# Patient Record
Sex: FEMALE | Race: WHITE | NOT HISPANIC OR LATINO | URBAN - METROPOLITAN AREA
[De-identification: names, ages, dates, MRNs, and addresses within clinical notes are randomized per-mention and may not be internally consistent; named-entity substitution may affect disease eponyms.]

---

## 2021-02-25 ENCOUNTER — HOSPITAL ENCOUNTER (OUTPATIENT)
Dept: CARDIOLOGY | Facility: HOSPITAL | Age: 71
Discharge: HOME | End: 2021-02-25
Attending: SURGERY

## 2022-08-25 ENCOUNTER — HOSPITAL ENCOUNTER (INPATIENT)
Dept: HOSPITAL 49 - FER | Age: 72
LOS: 6 days | Discharge: NURSING, REHAB OR LONG-TERM CARE | DRG: 871 | End: 2022-08-31
Attending: INTERNAL MEDICINE | Admitting: FAMILY MEDICINE
Payer: COMMERCIAL

## 2022-08-25 VITALS — HEIGHT: 62 IN | BODY MASS INDEX: 17.28 KG/M2 | WEIGHT: 93.92 LBS

## 2022-08-25 DIAGNOSIS — E87.0: ICD-10-CM

## 2022-08-25 DIAGNOSIS — Z20.822: ICD-10-CM

## 2022-08-25 DIAGNOSIS — K56.41: ICD-10-CM

## 2022-08-25 DIAGNOSIS — E44.0: ICD-10-CM

## 2022-08-25 DIAGNOSIS — J96.21: ICD-10-CM

## 2022-08-25 DIAGNOSIS — G93.41: ICD-10-CM

## 2022-08-25 DIAGNOSIS — Z79.02: ICD-10-CM
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
## 2022-08-25 DIAGNOSIS — Z66: ICD-10-CM

## 2022-08-25 DIAGNOSIS — E86.0: ICD-10-CM

## 2022-08-25 DIAGNOSIS — D64.9: ICD-10-CM

## 2022-08-25 DIAGNOSIS — I69.954: ICD-10-CM

## 2022-08-25 DIAGNOSIS — E78.5: ICD-10-CM

## 2022-08-25 DIAGNOSIS — A41.9: Primary | ICD-10-CM

## 2022-08-25 DIAGNOSIS — Z79.899: ICD-10-CM

## 2022-08-25 DIAGNOSIS — J98.11: ICD-10-CM

## 2022-08-25 DIAGNOSIS — J69.0: ICD-10-CM

## 2022-08-25 DIAGNOSIS — K94.21: ICD-10-CM

## 2022-08-25 DIAGNOSIS — E83.52: ICD-10-CM

## 2022-08-25 DIAGNOSIS — Z79.82: ICD-10-CM

## 2022-08-25 DIAGNOSIS — L89.313: ICD-10-CM

## 2022-08-25 DIAGNOSIS — E05.90: ICD-10-CM

## 2022-08-25 DIAGNOSIS — E11.9: ICD-10-CM

## 2022-08-25 DIAGNOSIS — J18.9: ICD-10-CM

## 2022-08-25 DIAGNOSIS — N17.9: ICD-10-CM

## 2022-08-25 LAB
ALBUMIN SERPL-MCNC: 2.5 G/DL (ref 3.4–5)
ALKALINE PHOSHATASE: 134 U/L (ref 46–116)
ALT SERPL-CCNC: 27 U/L (ref 14–59)
AST: 22 U/L (ref 15–37)
BASOPHIL: 0.3 % (ref 0–2)
BILIRUBIN - TOTAL: 0.3 MG/DL (ref 0.2–1)
BILIRUBIN: NEGATIVE MG/DL
BLOOD: NEGATIVE ERY/UL
BUN SERPL-MCNC: 36 MG/DL (ref 7–18)
BUN SERPL-MCNC: 43 MG/DL (ref 7–18)
BUN SERPL-MCNC: 44 MG/DL (ref 7–18)
BUN/CREAT RATIO (CALC): 39.6 RATIO
BUN/CREAT RATIO (CALC): 40.2 RATIO
BUN/CREAT RATIO (CALC): 50.6 RATIO
CHLORIDE: 118 MMOL/L (ref 98–107)
CLARITY UR: CLEAR
CO2 (BICARBONATE): 33 MMOL/L (ref 21–32)
CO2 (BICARBONATE): 34 MMOL/L (ref 21–32)
CO2 (BICARBONATE): 34 MMOL/L (ref 21–32)
COLOR: YELLOW
CORONAVIRUS 2019 SARS-COV-2: NEGATIVE
CREATININE: 0.87 MG/DL (ref 0.51–0.95)
CREATININE: 0.91 MG/DL (ref 0.51–0.95)
CREATININE: 1.07 MG/DL (ref 0.51–0.95)
EOSINOPHIL: 0.1 % (ref 0–7)
GLOBULIN (CALCULATION): 5.6 G/DL
GLUCOSE (U): NORMAL MG/DL
GLUCOSE SERPL-MCNC: 130 MG/DL (ref 74–106)
GLUCOSE SERPL-MCNC: 133 MG/DL (ref 74–106)
GLUCOSE SERPL-MCNC: 135 MG/DL (ref 74–106)
HCT: 42.8 % (ref 37–47)
HGB BLD-MCNC: 12.5 G/DL (ref 12.5–16)
INFLUENZA A NAA: NEGATIVE
INR PPP: 1.11 (ref 0.9–1.2)
LACTIC ACID: 1.8 MMOL/L (ref 0.4–1.9)
LEUKOCYTES: (no result) LEU/UL
LIPASE: 62 U/L (ref 73–393)
LYMPHOCYTE: 7 % (ref 15–48)
MCH RBC QN AUTO: 27 PG (ref 25–31)
MCHC RBC AUTO-ENTMCNC: 29.2 G/DL (ref 32–36)
MCV: 92.4 FL (ref 78–100)
MONOCYTE: 7.4 % (ref 0–12)
MPV: 10.7 FL (ref 6–9.5)
NEUTROPHIL: 84.8 % (ref 41–80)
NITRITE: NEGATIVE MG/DL
NRBC: 0
PLT: 339 K/UL (ref 150–400)
POTASSIUM: 3.5 MMOL/L (ref 3.5–5.1)
POTASSIUM: 3.8 MMOL/L (ref 3.5–5.1)
POTASSIUM: 3.9 MMOL/L (ref 3.5–5.1)
PROTEIN: NEGATIVE MG/DL
PROTHROMBIN TIME: 14 SECONDS (ref 11.9–13.9)
PTT: 30 SECONDS (ref 24.9–34.6)
RBC MORPHOLOGY: NORMAL
RBC: 4.63 M/UL (ref 4.2–5.4)
RDW: 16.4 % (ref 11.5–14)
SPECIFIC GRAVITY: 1.01 (ref 1–1.03)
TOTAL PROTEIN: 8.1 G/DL (ref 6.4–8.2)
URINARY WBC: (no result)
UROBILINOGEN: 0.2 MG/DL (ref 0.2–1)
WBC: 18.9 K/UL (ref 4–10.5)
YEAST: (no result)

## 2022-08-25 PROCEDURE — C1751 CATH, INF, PER/CENT/MIDLINE: HCPCS

## 2022-08-25 PROCEDURE — 3E03329 INTRODUCTION OF OTHER ANTI-INFECTIVE INTO PERIPHERAL VEIN, PERCUTANEOUS APPROACH: ICD-10-PCS | Performed by: NURSE PRACTITIONER

## 2022-08-25 PROCEDURE — U0002 COVID-19 LAB TEST NON-CDC: HCPCS

## 2022-08-25 PROCEDURE — 06HY33Z INSERTION OF INFUSION DEVICE INTO LOWER VEIN, PERCUTANEOUS APPROACH: ICD-10-PCS | Performed by: FAMILY MEDICINE

## 2022-08-26 LAB
ALBUMIN SERPL-MCNC: 2 G/DL (ref 3.4–5)
ALKALINE PHOSHATASE: 106 U/L (ref 46–116)
ALT SERPL-CCNC: 35 U/L (ref 14–59)
AST: 21 U/L (ref 15–37)
BASOPHIL: 0.2 % (ref 0–2)
BILIRUBIN - TOTAL: 0.2 MG/DL (ref 0.2–1)
BUN SERPL-MCNC: 25 MG/DL (ref 7–18)
BUN SERPL-MCNC: 27 MG/DL (ref 7–18)
BUN SERPL-MCNC: 33 MG/DL (ref 7–18)
BUN/CREAT RATIO (CALC): 35.7 RATIO
BUN/CREAT RATIO (CALC): 37 RATIO
BUN/CREAT RATIO (CALC): 38.8 RATIO
CHLORIDE: 116 MMOL/L (ref 98–107)
CHLORIDE: 117 MMOL/L (ref 98–107)
CHLORIDE: 119 MMOL/L (ref 98–107)
CO2 (BICARBONATE): 30 MMOL/L (ref 21–32)
CO2 (BICARBONATE): 31 MMOL/L (ref 21–32)
CO2 (BICARBONATE): 32 MMOL/L (ref 21–32)
CREATININE: 0.7 MG/DL (ref 0.51–0.95)
CREATININE: 0.73 MG/DL (ref 0.51–0.95)
CREATININE: 0.85 MG/DL (ref 0.51–0.95)
EOSINOPHIL: 0.8 % (ref 0–7)
FOLIC ACID (SERUM): 72.7 NG/ML (ref 8.6–58.9)
GLOBULIN (CALCULATION): 4.5 G/DL
GLUCOSE SERPL-MCNC: 116 MG/DL (ref 74–106)
GLUCOSE SERPL-MCNC: 124 MG/DL (ref 74–106)
GLUCOSE SERPL-MCNC: 129 MG/DL (ref 74–106)
HCT: 30.4 % (ref 37–47)
HCT: 32 % (ref 37–47)
HGB BLD-MCNC: 8.8 G/DL (ref 12.5–16)
HGB BLD-MCNC: 9.2 G/DL (ref 12.5–16)
IRON % SATURATION: 7.7 %SAT (ref 20–50)
IRON SERPL-MCNC: 23 UG/DL (ref 50–170)
LYMPHOCYTE: 12.9 % (ref 15–48)
MAGNESIUM SERPL-MCNC: 2.3 MG/DL (ref 1.8–2.4)
MCH RBC QN AUTO: 27.1 PG (ref 25–31)
MCHC RBC AUTO-ENTMCNC: 28.8 G/DL (ref 32–36)
MCV: 94.1 FL (ref 78–100)
MONOCYTE: 6.1 % (ref 0–12)
MPV: 10.7 FL (ref 6–9.5)
NEUTROPHIL: 79.5 % (ref 41–80)
NRBC: 0
PLT: 234 K/UL (ref 150–400)
POTASSIUM: 2.8 MMOL/L (ref 3.5–5.1)
POTASSIUM: 3 MMOL/L (ref 3.5–5.1)
POTASSIUM: 3.2 MMOL/L (ref 3.5–5.1)
RBC MORPHOLOGY: (no result)
RBC: 3.4 M/UL (ref 4.2–5.4)
RDW: 15.7 % (ref 11.5–14)
TOTAL PROTEIN: 6.5 G/DL (ref 6.4–8.2)
VITAMIN B12: 959 PG/ML (ref 193–986)
WBC: 9.5 K/UL (ref 4–10.5)

## 2022-08-27 LAB
BASOPHIL: 0.3 % (ref 0–2)
BUN SERPL-MCNC: 23 MG/DL (ref 7–18)
BUN/CREAT RATIO (CALC): 37.1 RATIO
C-REACTIVE PROTEIN: 4.7 MG/DL (ref ?–0.9)
CHLORIDE: 117 MMOL/L (ref 98–107)
CO2 (BICARBONATE): 28 MMOL/L (ref 21–32)
CREATININE: 0.62 MG/DL (ref 0.51–0.95)
EOSINOPHIL: 1.6 % (ref 0–7)
GLUCOSE SERPL-MCNC: 135 MG/DL (ref 74–106)
HCT: 33.8 % (ref 37–47)
HGB BLD-MCNC: 9.4 G/DL (ref 12.5–16)
LYMPHOCYTE: 17.4 % (ref 15–48)
MAGNESIUM SERPL-MCNC: 2.5 MG/DL (ref 1.8–2.4)
MCH RBC QN AUTO: 26.6 PG (ref 25–31)
MCHC RBC AUTO-ENTMCNC: 27.8 G/DL (ref 32–36)
MCV: 95.8 FL (ref 78–100)
MONOCYTE: 6.2 % (ref 0–12)
MPV: 10.9 FL (ref 6–9.5)
NEUTROPHIL: 74.1 % (ref 41–80)
NRBC: 0
PLT: 219 K/UL (ref 150–400)
POTASSIUM: 3.1 MMOL/L (ref 3.5–5.1)
RBC MORPHOLOGY: (no result)
RBC: 3.53 M/UL (ref 4.2–5.4)
RDW: 15.6 % (ref 11.5–14)
WBC: 7.6 K/UL (ref 4–10.5)

## 2022-08-28 LAB
BUN SERPL-MCNC: 13 MG/DL (ref 7–18)
BUN/CREAT RATIO (CALC): 24.5 RATIO
C-REACTIVE PROTEIN: 2.7 MG/DL (ref ?–0.9)
CHLORIDE: 112 MMOL/L (ref 98–107)
CO2 (BICARBONATE): 26 MMOL/L (ref 21–32)
CREATININE: 0.53 MG/DL (ref 0.51–0.95)
GLUCOSE SERPL-MCNC: 107 MG/DL (ref 74–106)
POTASSIUM: 3.3 MMOL/L (ref 3.5–5.1)

## 2022-08-28 PROCEDURE — 02HV33Z INSERTION OF INFUSION DEVICE INTO SUPERIOR VENA CAVA, PERCUTANEOUS APPROACH: ICD-10-PCS | Performed by: NURSE PRACTITIONER

## 2022-08-28 NOTE — NUR
8/27-PATIENT CONTINUOUSLY WOULD TRY TO LAY SIDEWAYS IN BED IN ATTEMPT TO LAY
FLAT. STAFF REPEATEDLY REPOSITIONED PATIENT Q15-30 MINUTES TO TRY TO KEEP HER
AT 30-35  DEGREE
THROUGHOUT THE SHIFT. PATIENT HAD COPIOUS AMOUNTS OF YELLOW SPUTUM,
SHOWED  HE STATED IT LOOKED LIKE PURE INFECTION.
REPORTED THIS TO PM SHIFT.
8/28-PM SHIFT REPORTED SAME ISSUE WITH TRYING TO KEEP PATIENT AT 35DEGREE
FOR FEEDS BUT HAD TO CONTINUE TO REPOSITION.
SECRETIONS BEGAN TO LOOK TUBE FEED COLOR AND TUBE FEEDS WERE IMMEDIATELY
STOPPED.
 HELD TUBE FEEDS FOR DURATION OF DAYSHIFT.
SECRETIONS WERE LESS THAN PREVIOUS AM SHIFT AND WERE WHITE/YELLOW IN COLOR.
CONTINUAL REPOSITIONING CONTINUED THROUGHOUT SHIFT THIS DATE AS WELL.
PICC LINE WAS PLACED SINCE CENTRAL LINE WAS LEAKING. CENTRAL LINE TO BE
REMOVED.
PERIPHERAL IV BECAME EDEMATOUS AND LEAKING. WAS REMOVED AFTER PICC LINE WAS
PLACED BY MAINOR HADLEY. SON GAVE CONSENT. PAPERWORK IN CHART

## 2022-08-29 LAB
ALBUMIN SERPL-MCNC: 2 G/DL (ref 3.4–5)
ALKALINE PHOSHATASE: 98 U/L (ref 46–116)
ALT SERPL-CCNC: 25 U/L (ref 14–59)
AST: 18 U/L (ref 15–37)
BASOPHIL: 0.3 % (ref 0–2)
BILIRUBIN - TOTAL: 0.3 MG/DL (ref 0.2–1)
BUN SERPL-MCNC: 9 MG/DL (ref 7–18)
BUN/CREAT RATIO (CALC): 14.3 RATIO
C-REACTIVE PROTEIN: 4.2 MG/DL (ref ?–0.9)
CHLORIDE: 110 MMOL/L (ref 98–107)
CO2 (BICARBONATE): 30 MMOL/L (ref 21–32)
CREATININE: 0.63 MG/DL (ref 0.51–0.95)
EOSINOPHIL: 2.5 % (ref 0–7)
GLOBULIN (CALCULATION): 4.3 G/DL
GLUCOSE SERPL-MCNC: 86 MG/DL (ref 74–106)
HCT: 30.4 % (ref 37–47)
HGB BLD-MCNC: 9 G/DL (ref 12.5–16)
LYMPHOCYTE: 20.1 % (ref 15–48)
MAGNESIUM SERPL-MCNC: 1.9 MG/DL (ref 1.8–2.4)
MCH RBC QN AUTO: 26.7 PG (ref 25–31)
MCHC RBC AUTO-ENTMCNC: 29.6 G/DL (ref 32–36)
MCV: 90.2 FL (ref 78–100)
MONOCYTE: 6.9 % (ref 0–12)
MPV: 10.6 FL (ref 6–9.5)
NEUTROPHIL: 69.8 % (ref 41–80)
NRBC: 0
PLT: 242 K/UL (ref 150–400)
POTASSIUM: 3.5 MMOL/L (ref 3.5–5.1)
RBC MORPHOLOGY: NORMAL
RBC: 3.37 M/UL (ref 4.2–5.4)
RDW: 15.2 % (ref 11.5–14)
TOTAL PROTEIN: 6.3 G/DL (ref 6.4–8.2)
WBC: 7.1 K/UL (ref 4–10.5)

## 2022-08-29 NOTE — NUR
8/29/22 This  and Dr. Oswald met with Michael Woods, son, and Rosmery Jacobsonr, mother, on 8/26/22. At this time, family made a decision for DNR code
status. - Ms. Avalos was admitted from [facility]. [facility] will accept
patient back. However, a new pre-auth will be needed. - The family would
prefer for Ms. Avalos to return to [facility], [facility], [place] or [facility]. - Ms. Avalos is not medical ready for for
discharge.

## 2022-08-30 NOTE — NUR
Phone call to [facility] re: patient's new tube feeding formula and tolerance to
change.  RN reports pt advanced to 35cc/hr this morning with no observable s/s
of intolerance.  Will monitor medical condition with further recs for d/c.

## 2022-08-30 NOTE — NUR
8/30/22 [facility] declined to accept patient back.
[facility] nor [facility] accept patients with trachs. [place] and
[facility] do accept patients with trach. However, they do
not have available beds. Ms. Avalos has been placed on the waiting list at
both of these facilities. - Clinicals have been submitted to [facility] for
insurance authorization to be initiated.

## 2022-08-30 NOTE — NUR
8/30/22 Insurance denied admission to [facility] per Peer to Peer. Dayne Mahan
reports that [facility] will accept as Medicaid pending. - Patient was also
placed on the waiting list at [facility] per family request.

## 2022-08-31 LAB
BASOPHIL: 0.5 % (ref 0–2)
BUN SERPL-MCNC: 8 MG/DL (ref 7–18)
BUN/CREAT RATIO (CALC): 14.3 RATIO
CHLORIDE: 108 MMOL/L (ref 98–107)
CO2 (BICARBONATE): 30 MMOL/L (ref 21–32)
CREATININE: 0.56 MG/DL (ref 0.51–0.95)
EOSINOPHIL: 2.3 % (ref 0–7)
GLUCOSE SERPL-MCNC: 107 MG/DL (ref 74–106)
HCT: 30.8 % (ref 37–47)
HGB BLD-MCNC: 9 G/DL (ref 12.5–16)
LYMPHOCYTE: 20.6 % (ref 15–48)
MCH RBC QN AUTO: 26.3 PG (ref 25–31)
MCHC RBC AUTO-ENTMCNC: 29.2 G/DL (ref 32–36)
MCV: 90.1 FL (ref 78–100)
MONOCYTE: 7.8 % (ref 0–12)
MPV: 10 FL (ref 6–9.5)
NEUTROPHIL: 67.9 % (ref 41–80)
NRBC: 0
PLT: 250 K/UL (ref 150–400)
POTASSIUM: 3.3 MMOL/L (ref 3.5–5.1)
RBC MORPHOLOGY: NORMAL
RBC: 3.42 M/UL (ref 4.2–5.4)
RDW: 15.1 % (ref 11.5–14)
WBC: 6.4 K/UL (ref 4–10.5)

## 2022-09-05 ENCOUNTER — HOSPITAL ENCOUNTER (INPATIENT)
Dept: HOSPITAL 49 - FER | Age: 72
LOS: 3 days | Discharge: NURSING, REHAB OR LONG-TERM CARE | DRG: 698 | End: 2022-09-08
Attending: INTERNAL MEDICINE | Admitting: INTERNAL MEDICINE
Payer: COMMERCIAL

## 2022-09-05 VITALS — HEIGHT: 62.01 IN | BODY MASS INDEX: 17.99 KG/M2 | WEIGHT: 99 LBS

## 2022-09-05 DIAGNOSIS — E11.9: ICD-10-CM

## 2022-09-05 DIAGNOSIS — R91.8: ICD-10-CM

## 2022-09-05 DIAGNOSIS — R56.9: ICD-10-CM
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
## 2022-09-05 DIAGNOSIS — N39.0: ICD-10-CM

## 2022-09-05 DIAGNOSIS — Z74.01: ICD-10-CM

## 2022-09-05 DIAGNOSIS — E03.9: ICD-10-CM

## 2022-09-05 DIAGNOSIS — N17.9: ICD-10-CM

## 2022-09-05 DIAGNOSIS — Z20.822: ICD-10-CM

## 2022-09-05 DIAGNOSIS — I10: ICD-10-CM

## 2022-09-05 DIAGNOSIS — E43: ICD-10-CM

## 2022-09-05 DIAGNOSIS — E78.5: ICD-10-CM

## 2022-09-05 DIAGNOSIS — L89.312: ICD-10-CM

## 2022-09-05 DIAGNOSIS — E87.6: ICD-10-CM

## 2022-09-05 DIAGNOSIS — R65.21: ICD-10-CM

## 2022-09-05 DIAGNOSIS — A41.89: ICD-10-CM

## 2022-09-05 DIAGNOSIS — D64.9: ICD-10-CM

## 2022-09-05 DIAGNOSIS — J96.90: ICD-10-CM

## 2022-09-05 DIAGNOSIS — I67.89: ICD-10-CM

## 2022-09-05 DIAGNOSIS — T83.511A: Primary | ICD-10-CM

## 2022-09-05 LAB
ALBUMIN SERPL-MCNC: 2.2 G/DL (ref 3.4–5)
ALKALINE PHOSHATASE: 94 U/L (ref 46–116)
ALT SERPL-CCNC: 26 U/L (ref 14–59)
AMORPHOUS PHOSPHATE CRYSTALS: (no result)
AST: 29 U/L (ref 15–37)
BACTERIA: (no result)
BASOPHIL: 0.4 % (ref 0–2)
BILIRUBIN - TOTAL: 0.2 MG/DL (ref 0.2–1)
BILIRUBIN: NEGATIVE MG/DL
BILIRUBIN: NEGATIVE MG/DL
BLOOD: (no result) ERY/UL
BLOOD: NEGATIVE ERY/UL
BUN SERPL-MCNC: 45 MG/DL (ref 7–18)
BUN/CREAT RATIO (CALC): 32.4 RATIO
CHLORIDE: 109 MMOL/L (ref 98–107)
CLARITY UR: (no result)
CLARITY UR: CLEAR
CO2 (BICARBONATE): 30 MMOL/L (ref 21–32)
COLOR: YELLOW
COLOR: YELLOW
CREATININE: 1.39 MG/DL (ref 0.51–0.95)
EOSINOPHIL: 0 % (ref 0–7)
GLOBULIN (CALCULATION): 4.9 G/DL
GLUCOSE (U): NORMAL MG/DL
GLUCOSE (U): NORMAL MG/DL
GLUCOSE SERPL-MCNC: 132 MG/DL (ref 74–106)
GRAN CASTS #/AREA URNS LPF: (no result) /[LPF]
HCT: 35.2 % (ref 37–47)
HGB BLD-MCNC: 10.5 G/DL (ref 12.5–16)
INR PPP: 1.1 (ref 0.9–1.2)
LACTIC ACID: 2.2 MMOL/L (ref 0.4–1.9)
LEUKOCYTES: (no result) LEU/UL
LEUKOCYTES: NEGATIVE LEU/UL
LYMPHOCYTE: 7.2 % (ref 15–48)
MCH RBC QN AUTO: 27.5 PG (ref 25–31)
MCHC RBC AUTO-ENTMCNC: 29.8 G/DL (ref 32–36)
MCV: 92.1 FL (ref 78–100)
MONOCYTE: 2.3 % (ref 0–12)
MPV: 10.3 FL (ref 6–9.5)
NEUTROPHIL: 89.4 % (ref 41–80)
NITRITE: NEGATIVE MG/DL
NITRITE: NEGATIVE MG/DL
NRBC: 0.1
PLT: 383 K/UL (ref 150–400)
POTASSIUM: 4.5 MMOL/L (ref 3.5–5.1)
PROTEIN: (no result) MG/DL
PROTEIN: NEGATIVE MG/DL
PROTHROMBIN TIME: 13.9 SECONDS (ref 11.9–13.9)
PTT: 30.4 SECONDS (ref 24.9–34.6)
RBC MORPHOLOGY: NORMAL
RBC: 3.82 M/UL (ref 4.2–5.4)
RDW: 17.8 % (ref 11.5–14)
SPECIFIC GRAVITY: 1.01 (ref 1–1.03)
SPECIFIC GRAVITY: 1.02 (ref 1–1.03)
TOTAL PROTEIN: 7.1 G/DL (ref 6.4–8.2)
URINARY RBC: (no result)
UROBILINOGEN: 0.2 MG/DL (ref 0.2–1)
UROBILINOGEN: 0.2 MG/DL (ref 0.2–1)
WBC: 19.3 K/UL (ref 4–10.5)

## 2022-09-05 PROCEDURE — 3E033XZ INTRODUCTION OF VASOPRESSOR INTO PERIPHERAL VEIN, PERCUTANEOUS APPROACH: ICD-10-PCS | Performed by: INTERNAL MEDICINE

## 2022-09-05 PROCEDURE — 3E03329 INTRODUCTION OF OTHER ANTI-INFECTIVE INTO PERIPHERAL VEIN, PERCUTANEOUS APPROACH: ICD-10-PCS | Performed by: INTERNAL MEDICINE

## 2022-09-05 PROCEDURE — C9113 INJ PANTOPRAZOLE SODIUM, VIA: HCPCS

## 2022-09-05 PROCEDURE — U0002 COVID-19 LAB TEST NON-CDC: HCPCS

## 2022-09-05 PROCEDURE — 06HY33Z INSERTION OF INFUSION DEVICE INTO LOWER VEIN, PERCUTANEOUS APPROACH: ICD-10-PCS | Performed by: INTERNAL MEDICINE

## 2022-09-05 NOTE — NUR
PATIENT RECEIVED BY BED FROM ER. ADMISSION ASSESSMENTS COMPLETED. NO FAMILY TO
ORIENT OR Assist with ADMISSION

## 2022-09-06 LAB
ALBUMIN SERPL-MCNC: 1.7 G/DL (ref 3.4–5)
ALKALINE PHOSHATASE: 75 U/L (ref 46–116)
ALT SERPL-CCNC: 27 U/L (ref 14–59)
AST: 22 U/L (ref 15–37)
BASOPHIL: 0.4 % (ref 0–2)
BILIRUBIN - TOTAL: 0.2 MG/DL (ref 0.2–1)
BUN SERPL-MCNC: 22 MG/DL (ref 7–18)
BUN/CREAT RATIO (CALC): 30 RATIO
CHLORIDE: 114 MMOL/L (ref 98–107)
CO2 (BICARBONATE): 26 MMOL/L (ref 21–32)
CREATININE: 0.73 MG/DL (ref 0.51–0.95)
EOSINOPHIL: 0.4 % (ref 0–7)
GLOBULIN (CALCULATION): 4 G/DL
GLUCOSE SERPL-MCNC: 85 MG/DL (ref 74–106)
HCT: 28 % (ref 37–47)
HGB BLD-MCNC: 8.4 G/DL (ref 12.5–16)
LYMPHOCYTE: 20.4 % (ref 15–48)
MCH RBC QN AUTO: 27.5 PG (ref 25–31)
MCHC RBC AUTO-ENTMCNC: 30 G/DL (ref 32–36)
MCV: 91.5 FL (ref 78–100)
MONOCYTE: 4.4 % (ref 0–12)
MPV: 9.9 FL (ref 6–9.5)
NEUTROPHIL: 74.1 % (ref 41–80)
NRBC: 0
PLT: 203 K/UL (ref 150–400)
POTASSIUM: 3.3 MMOL/L (ref 3.5–5.1)
RBC MORPHOLOGY: NORMAL
RBC: 3.06 M/UL (ref 4.2–5.4)
RDW: 17 % (ref 11.5–14)
TOTAL PROTEIN: 5.7 G/DL (ref 6.4–8.2)
WBC: 7.2 K/UL (ref 4–10.5)

## 2022-09-06 NOTE — NUR
9/6/22 Ms. Avalos was admitted from [facility]. They will submit clinicals
to insurance. If insurance denies, they will accept back as Medicaid pending.
A referal was also submitted to [facility] per patient's mother's request.

## 2022-09-06 NOTE — NUR
LEVO GTT STOPPED AT 0230 ON 9/6
 
2135- /58 (77), LEVO TURNED DOWN TO 6MCG
0020- /77 (98), LEVO TURNED DOWN TO 4MCG
0145- /56 (83), LEVO TURNED DOWN TO 2MCG
0230- 133/64 (87), LEVO STOPPED

## 2022-09-07 LAB
ALBUMIN SERPL-MCNC: 1.8 G/DL (ref 3.4–5)
ALKALINE PHOSHATASE: 75 U/L (ref 46–116)
ALT SERPL-CCNC: 21 U/L (ref 14–59)
AST: 22 U/L (ref 15–37)
BASOPHIL(M): 1 % (ref 0–2)
BASOPHIL: 0.3 % (ref 0–2)
BILIRUBIN - TOTAL: 0.2 MG/DL (ref 0.2–1)
BUN SERPL-MCNC: 13 MG/DL (ref 7–18)
BUN/CREAT RATIO (CALC): 21.7 RATIO
CHLORIDE: 108 MMOL/L (ref 98–107)
CO2 (BICARBONATE): 28 MMOL/L (ref 21–32)
CREATININE: 0.6 MG/DL (ref 0.51–0.95)
EOSINOPHIL(M): 1 % (ref 0–7)
EOSINOPHIL: 2.2 % (ref 0–7)
GLOBULIN (CALCULATION): 4.1 G/DL
GLUCOSE SERPL-MCNC: 118 MG/DL (ref 74–106)
HCT: 28.7 % (ref 37–47)
HGB BLD-MCNC: 8.7 G/DL (ref 12.5–16)
LYMPHOCYTE(M): 15 % (ref 15–48)
LYMPHOCYTE: 20.5 % (ref 15–48)
MCH RBC QN AUTO: 27.2 PG (ref 25–31)
MCHC RBC AUTO-ENTMCNC: 30.3 G/DL (ref 32–36)
MCV: 89.7 FL (ref 78–100)
MONOCYTE(M): 3 % (ref 0–12)
MONOCYTE: 4.7 % (ref 0–12)
MPV: 10.2 FL (ref 6–9.5)
NEUTROPHIL: 71.9 % (ref 41–80)
NEUTROPHILS(M): 80 % (ref 41–80)
PLATELET ESTIMATE: NORMAL
PLATELET MORPHOLOGY: NORMAL
PLT: 205 K/UL (ref 150–400)
POTASSIUM: 3.5 MMOL/L (ref 3.5–5.1)
RBC MORPHOLOGY: NORMAL
RBC: 3.2 M/UL (ref 4.2–5.4)
RDW: 16.4 % (ref 11.5–14)
TOTAL CELL COUNT: 100
TOTAL PROTEIN: 5.9 G/DL (ref 6.4–8.2)
WBC: 6.8 K/UL (ref 4–10.5)

## 2022-09-07 NOTE — NUR
9/7/22 Discharge is anticipated for 9/8. Dayne Mahan at [facility] was informed.
Again, [facility] will accept as Medicaid pending.

## 2022-09-08 LAB
BASOPHIL: 0.3 % (ref 0–2)
BUN SERPL-MCNC: 10 MG/DL (ref 7–18)
BUN/CREAT RATIO (CALC): 19.6 RATIO
CHLORIDE: 110 MMOL/L (ref 98–107)
CO2 (BICARBONATE): 28 MMOL/L (ref 21–32)
CREATININE: 0.51 MG/DL (ref 0.51–0.95)
EOSINOPHIL: 1.8 % (ref 0–7)
GLUCOSE SERPL-MCNC: 117 MG/DL (ref 74–106)
HCT: 28.3 % (ref 37–47)
HGB BLD-MCNC: 8.6 G/DL (ref 12.5–16)
LYMPHOCYTE: 18.2 % (ref 15–48)
MCH RBC QN AUTO: 27.3 PG (ref 25–31)
MCHC RBC AUTO-ENTMCNC: 30.4 G/DL (ref 32–36)
MCV: 89.8 FL (ref 78–100)
MONOCYTE: 6.3 % (ref 0–12)
MPV: 10.4 FL (ref 6–9.5)
NEUTROPHIL: 73.1 % (ref 41–80)
NRBC: 0
PLT: 230 K/UL (ref 150–400)
POTASSIUM: 3.9 MMOL/L (ref 3.5–5.1)
RBC MORPHOLOGY: NORMAL
RBC: 3.15 M/UL (ref 4.2–5.4)
RDW: 17.1 % (ref 11.5–14)
WBC: 6.1 K/UL (ref 4–10.5)

## 2022-09-09 ENCOUNTER — HOSPITAL ENCOUNTER (EMERGENCY)
Dept: HOSPITAL 49 - FER | Age: 72
Discharge: OTHER INSTITUTION | End: 2022-09-09
Payer: COMMERCIAL

## 2022-09-09 DIAGNOSIS — Z20.822: ICD-10-CM

## 2022-09-09 DIAGNOSIS — J95.09: Primary | ICD-10-CM

## 2022-09-09 DIAGNOSIS — Z86.73: ICD-10-CM

## 2022-09-09 LAB
ALBUMIN SERPL-MCNC: 2.7 G/DL (ref 3.4–5)
ALKALINE PHOSHATASE: 99 U/L (ref 46–116)
ALT SERPL-CCNC: 23 U/L (ref 14–59)
AST: 23 U/L (ref 15–37)
BASOPHIL: 0.4 % (ref 0–2)
BILIRUBIN - TOTAL: 0.2 MG/DL (ref 0.2–1)
BUN SERPL-MCNC: 12 MG/DL (ref 7–18)
BUN/CREAT RATIO (CALC): 23.1 RATIO
CHLORIDE: 105 MMOL/L (ref 98–107)
CO2 (BICARBONATE): 30 MMOL/L (ref 21–32)
CORONAVIRUS 2019 SARS-COV-2: NEGATIVE
CREATININE: 0.52 MG/DL (ref 0.51–0.95)
EOSINOPHIL: 1 % (ref 0–7)
GLOBULIN (CALCULATION): 4.4 G/DL
GLUCOSE SERPL-MCNC: 101 MG/DL (ref 74–106)
HCT: 33.3 % (ref 37–47)
HGB BLD-MCNC: 10.1 G/DL (ref 12.5–16)
INFLUENZA A NAA: NEGATIVE
LYMPHOCYTE: 19.6 % (ref 15–48)
MCH RBC QN AUTO: 27.2 PG (ref 25–31)
MCHC RBC AUTO-ENTMCNC: 30.3 G/DL (ref 32–36)
MCV: 89.8 FL (ref 78–100)
MONOCYTE: 6.3 % (ref 0–12)
MPV: 9.8 FL (ref 6–9.5)
NEUTROPHIL: 72.4 % (ref 41–80)
NRBC: 0
PLT: 296 K/UL (ref 150–400)
POTASSIUM: 4.5 MMOL/L (ref 3.5–5.1)
RBC MORPHOLOGY: NORMAL
RBC: 3.71 M/UL (ref 4.2–5.4)
RDW: 17.2 % (ref 11.5–14)
TOTAL PROTEIN: 7.1 G/DL (ref 6.4–8.2)
WBC: 7.8 K/UL (ref 4–10.5)

## 2022-09-09 PROCEDURE — U0002 COVID-19 LAB TEST NON-CDC: HCPCS

## 2022-09-21 ENCOUNTER — HOSPITAL ENCOUNTER (EMERGENCY)
Dept: HOSPITAL 49 - FER | Age: 72
LOS: 1 days | Discharge: HOME | End: 2022-09-22
Payer: COMMERCIAL
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
## 2022-09-21 DIAGNOSIS — M25.512: ICD-10-CM

## 2022-09-21 DIAGNOSIS — Z86.73: ICD-10-CM

## 2022-09-21 DIAGNOSIS — T14.8XXA: Primary | ICD-10-CM
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
## 2022-09-21 DIAGNOSIS — M25.511: ICD-10-CM

## 2022-09-21 DIAGNOSIS — M25.551: ICD-10-CM

## 2022-09-29 ENCOUNTER — HOSPITAL ENCOUNTER (EMERGENCY)
Dept: HOSPITAL 49 - FER | Age: 72
Discharge: OTHER INSTITUTION | End: 2022-09-29
Payer: COMMERCIAL

## 2022-09-29 DIAGNOSIS — R09.2: ICD-10-CM

## 2022-09-29 DIAGNOSIS — Z20.822: ICD-10-CM

## 2022-09-29 DIAGNOSIS — E87.0: ICD-10-CM

## 2022-09-29 DIAGNOSIS — G93.1: ICD-10-CM

## 2022-09-29 DIAGNOSIS — A41.9: Primary | ICD-10-CM

## 2022-09-29 DIAGNOSIS — R65.21: ICD-10-CM

## 2022-09-29 DIAGNOSIS — N17.9: ICD-10-CM

## 2022-09-29 LAB
ALBUMIN SERPL-MCNC: 2 G/DL (ref 3.4–5)
ALKALINE PHOSHATASE: 130 U/L (ref 46–116)
ALT SERPL-CCNC: 38 U/L (ref 14–59)
AST: 50 U/L (ref 15–37)
BACTERIA: (no result)
BASOPHIL: 0.3 % (ref 0–2)
BILIRUBIN - TOTAL: 0.3 MG/DL (ref 0.2–1)
BILIRUBIN: NEGATIVE MG/DL
BLOOD: (no result) ERY/UL
BUN SERPL-MCNC: 50 MG/DL (ref 7–18)
BUN SERPL-MCNC: 58 MG/DL (ref 7–18)
BUN/CREAT RATIO (CALC): 36.9 RATIO
BUN/CREAT RATIO (CALC): 42.7 RATIO
CHLORIDE: 118 MMOL/L (ref 98–107)
CHLORIDE: 121 MMOL/L (ref 98–107)
CLARITY UR: CLEAR
CO2 (BICARBONATE): 24 MMOL/L (ref 21–32)
CO2 (BICARBONATE): 26 MMOL/L (ref 21–32)
COLOR: YELLOW
CORONAVIRUS 2019 SARS-COV-2: NEGATIVE
CREATININE: 1.17 MG/DL (ref 0.51–0.95)
CREATININE: 1.57 MG/DL (ref 0.51–0.95)
EOSINOPHIL: 0.1 % (ref 0–7)
GLOBULIN (CALCULATION): 4.7 G/DL
GLUCOSE (U): (no result) MG/DL
GLUCOSE SERPL-MCNC: 333 MG/DL (ref 74–106)
GLUCOSE SERPL-MCNC: 393 MG/DL (ref 74–106)
HCT: 37 % (ref 37–47)
HGB BLD-MCNC: 9.9 G/DL (ref 12.5–16)
INFLUENZA A NAA: NEGATIVE
LACTIC ACID: 9 MMOL/L (ref 0.4–1.9)
LEUKOCYTES: NEGATIVE LEU/UL
LYMPHOCYTE: 23.5 % (ref 15–48)
MCH RBC QN AUTO: 27.7 PG (ref 25–31)
MCHC RBC AUTO-ENTMCNC: 26.8 G/DL (ref 32–36)
MCV: 103.4 FL (ref 78–100)
MONOCYTE: 3.2 % (ref 0–12)
MPV: 11.8 FL (ref 6–9.5)
NEUTROPHIL: 68.9 % (ref 41–80)
NITRITE: NEGATIVE MG/DL
NRBC: 0.4
PLT: 283 K/UL (ref 150–400)
POTASSIUM: 3.4 MMOL/L (ref 3.5–5.1)
POTASSIUM: 4.4 MMOL/L (ref 3.5–5.1)
PROTEIN: (no result) MG/DL
RBC MORPHOLOGY: (no result)
RBC: 3.58 M/UL (ref 4.2–5.4)
RDW: 17.3 % (ref 11.5–14)
RENAL EPITHELIAL CELLS: (no result)
SPECIFIC GRAVITY: 1.02 (ref 1–1.03)
TOTAL PROTEIN: 6.7 G/DL (ref 6.4–8.2)
URINARY RBC: (no result)
URINARY WBC: (no result)
UROBILINOGEN: 0.2 MG/DL (ref 0.2–1)
WBC: 26 K/UL (ref 4–10.5)

## 2022-09-29 PROCEDURE — U0002 COVID-19 LAB TEST NON-CDC: HCPCS
